# Patient Record
Sex: MALE | Race: WHITE | NOT HISPANIC OR LATINO | Employment: FULL TIME | URBAN - METROPOLITAN AREA
[De-identification: names, ages, dates, MRNs, and addresses within clinical notes are randomized per-mention and may not be internally consistent; named-entity substitution may affect disease eponyms.]

---

## 2021-07-12 ENCOUNTER — HOSPITAL ENCOUNTER (OUTPATIENT)
Dept: GENERAL RADIOLOGY | Facility: HOSPITAL | Age: 41
Discharge: HOME | End: 2021-07-12

## 2021-07-12 ENCOUNTER — TRANSCRIBE ORDERS (OUTPATIENT)
Dept: OTHER | Facility: OTHER | Age: 41
End: 2021-07-12
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
## 2021-07-12 DIAGNOSIS — M54.2 NECK PAIN ON RIGHT SIDE: ICD-10-CM

## 2021-07-12 DIAGNOSIS — S42.024S CLOSED NONDISPLACED FRACTURE OF SHAFT OF RIGHT CLAVICLE, SEQUELA: ICD-10-CM

## 2021-07-12 DIAGNOSIS — M54.12 CERVICAL RADICULAR PAIN: ICD-10-CM

## 2021-07-12 DIAGNOSIS — S16.1XXA ACUTE STRAIN OF NECK MUSCLE, INITIAL ENCOUNTER: ICD-10-CM

## 2021-07-12 DIAGNOSIS — M62.838 NECK MUSCLE SPASM: ICD-10-CM

## 2021-07-12 DIAGNOSIS — M25.611 DECREASED RANGE OF MOTION OF RIGHT SHOULDER: ICD-10-CM

## 2021-07-12 PROCEDURE — 72040 X-RAY EXAM NECK SPINE 2-3 VW: CPT

## 2021-07-12 PROCEDURE — 73000 X-RAY EXAM OF COLLAR BONE: CPT

## 2021-07-12 PROCEDURE — 73020 X-RAY EXAM OF SHOULDER: CPT

## 2021-07-14 ENCOUNTER — TREATMENT (OUTPATIENT)
Dept: PHYSICAL THERAPY | Facility: CLINIC | Age: 41
End: 2021-07-14

## 2021-07-14 DIAGNOSIS — M62.838 MUSCLE SPASMS OF NECK: ICD-10-CM

## 2021-07-14 DIAGNOSIS — M54.12 RADICULOPATHY, CERVICAL: ICD-10-CM

## 2021-07-14 DIAGNOSIS — M54.2 PAIN, NECK: Primary | ICD-10-CM

## 2021-07-14 PROCEDURE — 97162 PT EVAL MOD COMPLEX 30 MIN: CPT | Performed by: PHYSICAL THERAPIST
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
## 2021-07-20 ENCOUNTER — TREATMENT (OUTPATIENT)
Dept: PHYSICAL THERAPY | Facility: CLINIC | Age: 41
End: 2021-07-20

## 2021-07-20 DIAGNOSIS — M54.12 RADICULOPATHY, CERVICAL: ICD-10-CM

## 2021-07-20 DIAGNOSIS — M62.838 MUSCLE SPASMS OF NECK: ICD-10-CM

## 2021-07-20 DIAGNOSIS — M54.2 PAIN, NECK: Primary | ICD-10-CM

## 2021-07-20 PROCEDURE — 97140 MANUAL THERAPY 1/> REGIONS: CPT | Performed by: PHYSICAL THERAPIST

## 2021-07-20 NOTE — PROGRESS NOTES
Physical Therapy Daily Treatment Note      Patient: Dg Dawn   : 1980  Referring practitioner: Truman Tobias MD  Date of Initial Visit: Type: THERAPY  Noted: 2021  Today's Date: 2021  Patient seen for 2 sessions           Subjective Questionnaire:       Subjective Evaluation    History of Present Illness    Subjective comment: Pt presents with R arm to his side with elbow slightly flexed.   Pt reported pain doesn't hurt at rest. Pt  feels burning in upper arm to elbow with lifting arm away from body in flexion and ABD.         Objective   See Exercise, Manual, and Modality Logs for complete treatment.       Assessment & Plan     Assessment  Assessment details: After manual pt could achieve flexion 90 degrees before feeling burning in upper arm, ABD to approximately 70 degrees before feeling burning.        Visit Diagnoses:    ICD-10-CM ICD-9-CM   1. Pain, neck  M54.2 723.1   2. Muscle spasms of neck  M62.838 728.85   3. Radiculopathy, cervical  M54.12 723.4     Progress per Plan of Care and Progress strengthening /stabilization /functional activity           Timed:  Manual Therapy:    27     mins  25541;  Therapeutic Exercise:         mins  47800;     Neuromuscular Cruz:        mins  08592;    Therapeutic Activity:          mins  40838;     Gait Training:           mins  91379;     Ultrasound:          mins  51779;    Electrical Stimulation:         mins  84603 ( );  Aquatic Therapy          mins  61421    Untimed:  Electrical Stimulation:         mins  62229 ( );  Mechanical Traction:         mins  57559;     Timed Treatment:   27   mins   Total Treatment:     27   mins  Jo Lund PTA  Physical Therapist

## 2021-07-26 ENCOUNTER — TREATMENT (OUTPATIENT)
Dept: PHYSICAL THERAPY | Facility: CLINIC | Age: 41
End: 2021-07-26

## 2021-07-26 DIAGNOSIS — M54.12 RADICULOPATHY, CERVICAL: ICD-10-CM

## 2021-07-26 DIAGNOSIS — M62.838 MUSCLE SPASMS OF NECK: ICD-10-CM

## 2021-07-26 DIAGNOSIS — M54.2 PAIN, NECK: Primary | ICD-10-CM

## 2021-07-26 PROCEDURE — 97012 MECHANICAL TRACTION THERAPY: CPT | Performed by: PHYSICAL THERAPIST

## 2021-07-26 NOTE — PROGRESS NOTES
Physical Therapy Daily Treatment Note      Patient: Dg Dawn   : 1980  Referring practitioner: Truman Tobias MD  Date of Initial Visit: Type: THERAPY  Noted: 2021  Today's Date: 2021  Patient seen for 3 sessions           Subjective   Dg Dawn reports: neck and right arm radicular symptoms better; pain 2/10.    Objective   See Exercise, Manual, and Modality Logs for complete treatment.       Assessment & Plan     Assessment  Prognosis details: Patient reports able to lift right arm higher without as much discomfort.  Patient states cervical traction felt good.        Visit Diagnoses:    ICD-10-CM ICD-9-CM   1. Pain, neck  M54.2 723.1   2. Muscle spasms of neck  M62.838 728.85   3. Radiculopathy, cervical  M54.12 723.4       Progress per Plan of Care           Timed:  Manual Therapy:         mins  08007;  Therapeutic Exercise:         mins  35722;     Neuromuscular Cruz:        mins  26219;    Therapeutic Activity:          mins  88582;     Gait Training:           mins  38868;     Ultrasound:          mins  45675;    Electrical Stimulation:         mins  41129 ( );    Untimed:  Electrical Stimulation:         mins  41583 ( );  Mechanical Traction:    15     mins  82743;     Timed Treatment:      mins   Total Treatment:     15   mins  Aruna Calzada PT  Physical Therapist

## 2021-07-28 ENCOUNTER — TREATMENT (OUTPATIENT)
Dept: PHYSICAL THERAPY | Facility: CLINIC | Age: 41
End: 2021-07-28

## 2021-07-28 DIAGNOSIS — M62.838 MUSCLE SPASMS OF NECK: ICD-10-CM

## 2021-07-28 DIAGNOSIS — M54.12 RADICULOPATHY, CERVICAL: ICD-10-CM

## 2021-07-28 DIAGNOSIS — M54.2 PAIN, NECK: Primary | ICD-10-CM

## 2021-07-28 PROCEDURE — 97110 THERAPEUTIC EXERCISES: CPT | Performed by: PHYSICAL THERAPIST

## 2021-07-28 PROCEDURE — 97012 MECHANICAL TRACTION THERAPY: CPT | Performed by: PHYSICAL THERAPIST

## 2021-07-28 NOTE — PROGRESS NOTES
"Physical Therapy Daily Treatment Note        Patient: Dg Dawn   : 1980  Referring practitioner: Truman Tobias MD  Date of Initial Visit: Type: THERAPY  Noted: 2021  Today's Date: 2021  Patient seen for 4 sessions           Subjective  Dg Dawn reports: intermittent pain at 1/10 if that. \"Doing that exercise where I push my head back into the headrest in the car.\"    Objective   See Exercise, Manual, and Modality Logs for complete treatment.   Observed obvious R clavicle fracture, well healed per pt history.  Utilized mirror to visually address postural deficits, pt able to correct his posture. He stated that it eased his discomfort R scapula.    Assessment/Plan  Performed B SCM release during traction, pt commented that it changed how his neck felt,\" better.\"  Visit Diagnoses:    ICD-10-CM ICD-9-CM   1. Pain, neck  M54.2 723.1   2. Muscle spasms of neck  M62.838 728.85   3. Radiculopathy, cervical  M54.12 723.4       Progress per Plan of Care           Timed:  Manual Therapy:         mins  24274;  Therapeutic Exercise:    13     mins  54984;     Neuromuscular Cruz:        mins  22557;    Therapeutic Activity:          mins  71898;     Gait Training:           mins  89233;     Ultrasound:          mins  12866;    Electrical Stimulation:         mins  29967 ( );  Aquatics  __   mins   59955    Untimed:  Electrical Stimulation:         mins  54387 ( );  Mechanical Traction:    15     mins  51783;     Timed Treatment:   13   mins   Total Treatment:     28   mins  Danica Orantes PTA  Physical Therapist                "

## 2021-08-05 ENCOUNTER — DOCUMENTATION (OUTPATIENT)
Dept: PHYSICAL THERAPY | Facility: CLINIC | Age: 41
End: 2021-08-05

## 2021-08-05 NOTE — PROGRESS NOTES
Discharge Summary  Discharge Summary from Physical Therapy Report      Dates  PT visit: Number of Visits: 4 visit total    Discharge Status of Patient: Pt phoned this a.m. reporting he is out of town, feels fine and requested to be discharged from therapy, cancelling all remaining therapy visits.     Goals  Plan Goals: 1. The patient has limited ROM.    LTG 1: 8 weeks:  The patient will demonstrate 65° of left cervical spine rotation in order to adequately monitor blind spots while driving and improve ability to perform activities of daily living.   STATUS: no formal measurements however pt stated he is fine when he phoned to cancel appts.  STG 1a: 6 weeks:  The patient will demonstrate 60° of left cervical spine rotation.  STATUS:  As above     2. The patient has complaints of pain.   LTG 2: 8 weeks:  The patient will report 2/10 pain in order to more easily tolerate activities of daily living and improve sleep quality.   STATUS: per note 07/14/2021 achieved  STG 2a: 6 weeks:  The patient will report 3/10 pain.   STATUS:  per documentation 07/14/2021 achieved.   3. The patient reports radicular symptoms in the right upper extremity.   LTG 3: 8 weeks:  The patient will report a decrease in radicular symptoms in the right upper extremity by 75%.   STATUS:  as above  STG 3a: 4 weeks:  The patient will report a decrease in radicular symptoms in the right upper extremity by 50%.   STATUS:  as above   4. Carrying, Moving, and Handling Objects Functional Limitation                   LTG 4: 8 weeks:  The patient will demonstrate 14% limitation by achieving a score of 7/50 on the Neck Disability Index.   STATUS:  no final formal measurements  STG 4a: 6 weeks:  The patient will demonstrate 24% limitation by achieving a score of 12/50 on the Neck Disability Index.     STATUS:  no final formal measurements     Discharge Plan: Pt phoned to cancel all visits, commenting he feels fine.    Date of Discharge effective  08/05/2021    Danica Orantes, PTA  Physical Therapist